# Patient Record
Sex: FEMALE | Race: BLACK OR AFRICAN AMERICAN
[De-identification: names, ages, dates, MRNs, and addresses within clinical notes are randomized per-mention and may not be internally consistent; named-entity substitution may affect disease eponyms.]

---

## 2022-10-27 ENCOUNTER — HOSPITAL ENCOUNTER (EMERGENCY)
Dept: HOSPITAL 26 - MED | Age: 27
Discharge: HOME | End: 2022-10-27
Payer: COMMERCIAL

## 2022-10-27 VITALS — WEIGHT: 240 LBS | BODY MASS INDEX: 42.52 KG/M2 | HEIGHT: 63 IN

## 2022-10-27 VITALS — SYSTOLIC BLOOD PRESSURE: 125 MMHG | DIASTOLIC BLOOD PRESSURE: 76 MMHG

## 2022-10-27 VITALS — SYSTOLIC BLOOD PRESSURE: 118 MMHG | DIASTOLIC BLOOD PRESSURE: 67 MMHG

## 2022-10-27 DIAGNOSIS — Z79.899: ICD-10-CM

## 2022-10-27 DIAGNOSIS — J02.9: ICD-10-CM

## 2022-10-27 DIAGNOSIS — J20.9: Primary | ICD-10-CM

## 2022-10-27 DIAGNOSIS — Z20.822: ICD-10-CM

## 2022-10-27 PROCEDURE — 87426 SARSCOV CORONAVIRUS AG IA: CPT

## 2022-10-27 PROCEDURE — 71045 X-RAY EXAM CHEST 1 VIEW: CPT

## 2022-10-27 PROCEDURE — 99284 EMERGENCY DEPT VISIT MOD MDM: CPT

## 2022-10-27 PROCEDURE — 87804 INFLUENZA ASSAY W/OPTIC: CPT

## 2022-10-27 NOTE — NUR
Pt d/c with VSS, A/Ox4. Pt states d/c and Rx education understood, will return 
to ED if symptoms worsen.